# Patient Record
Sex: MALE
[De-identification: names, ages, dates, MRNs, and addresses within clinical notes are randomized per-mention and may not be internally consistent; named-entity substitution may affect disease eponyms.]

---

## 2019-01-30 ENCOUNTER — TRANSCRIPTION ENCOUNTER (OUTPATIENT)
Age: 39
End: 2019-01-30

## 2022-11-30 ENCOUNTER — APPOINTMENT (OUTPATIENT)
Dept: ORTHOPEDIC SURGERY | Facility: CLINIC | Age: 42
End: 2022-11-30

## 2022-11-30 DIAGNOSIS — Z78.9 OTHER SPECIFIED HEALTH STATUS: ICD-10-CM

## 2022-11-30 DIAGNOSIS — S86.111A STRAIN OF OTHER MUSCLE(S) AND TENDON(S) OF POSTERIOR MUSCLE GROUP AT LOWER LEG LEVEL, RIGHT LEG, INITIAL ENCOUNTER: ICD-10-CM

## 2022-11-30 PROBLEM — Z00.00 ENCOUNTER FOR PREVENTIVE HEALTH EXAMINATION: Status: ACTIVE | Noted: 2022-11-30

## 2022-11-30 PROCEDURE — 99203 OFFICE O/P NEW LOW 30 MIN: CPT

## 2022-11-30 PROCEDURE — 73590 X-RAY EXAM OF LOWER LEG: CPT | Mod: RT

## 2022-11-30 NOTE — HISTORY OF PRESENT ILLNESS
[6] : 6 [3] : 3 [Localized] : localized [Sharp] : sharp [Stabbing] : stabbing [de-identified] : 11/30/2022 Mr. Tera Canela, a 42 year old male, presents today for right calf. Sudden onset right calf pain while playing basketball yesterday. \par Work:  /  [] : no [FreeTextEntry1] : right vcalf

## 2022-11-30 NOTE — DISCUSSION/SUMMARY
[de-identified] : 42m with medial gastroc strain\par 1) heel inserts for shoes\par 2) cryotherapy, rest and activity/exercise modification \par 3) physical therapy\par 4) rtc 4 weeks / prn\par \par Entered by Leda Tejeda acting as scribe.\par

## 2022-11-30 NOTE — PHYSICAL EXAM
[Right] : right foot and ankle [] : patient ambulates without assistive device [FreeTextEntry8] : ttp medial gastroc

## 2024-09-09 NOTE — IMAGING
Quality 130: Documentation Of Current Medications In The Medical Record: Current Medications Documented [Right] : right tibia/fibula Detail Level: Detailed [There are no fractures, subluxations or dislocations. No significant abnormalities are seen] : There are no fractures, subluxations or dislocations. No significant abnormalities are seen Quality 431: Preventive Care And Screening: Unhealthy Alcohol Use - Screening: Patient not identified as an unhealthy alcohol user when screened for unhealthy alcohol use using a systematic screening method Quality 226: Preventive Care And Screening: Tobacco Use: Screening And Cessation Intervention: Patient screened for tobacco use and is an ex/non-smoker